# Patient Record
Sex: FEMALE | Race: BLACK OR AFRICAN AMERICAN | NOT HISPANIC OR LATINO | URBAN - METROPOLITAN AREA
[De-identification: names, ages, dates, MRNs, and addresses within clinical notes are randomized per-mention and may not be internally consistent; named-entity substitution may affect disease eponyms.]

---

## 2019-03-21 ENCOUNTER — EMERGENCY (EMERGENCY)
Facility: HOSPITAL | Age: 54
LOS: 0 days | Discharge: HOME | End: 2019-03-22
Attending: EMERGENCY MEDICINE
Payer: COMMERCIAL

## 2019-03-21 VITALS
RESPIRATION RATE: 18 BRPM | HEART RATE: 68 BPM | DIASTOLIC BLOOD PRESSURE: 90 MMHG | SYSTOLIC BLOOD PRESSURE: 166 MMHG | TEMPERATURE: 98 F | OXYGEN SATURATION: 100 % | HEIGHT: 62 IN | WEIGHT: 175.93 LBS

## 2019-03-21 DIAGNOSIS — M54.2 CERVICALGIA: ICD-10-CM
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
## 2019-03-21 DIAGNOSIS — M54.9 DORSALGIA, UNSPECIFIED: ICD-10-CM
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
## 2019-03-21 DIAGNOSIS — I10 ESSENTIAL (PRIMARY) HYPERTENSION: ICD-10-CM

## 2019-03-21 DIAGNOSIS — M79.18 MYALGIA, OTHER SITE: ICD-10-CM

## 2019-03-21 PROCEDURE — 93010 ELECTROCARDIOGRAM REPORT: CPT

## 2019-03-21 PROCEDURE — 99284 EMERGENCY DEPT VISIT MOD MDM: CPT

## 2019-03-21 RX ORDER — CYCLOBENZAPRINE HYDROCHLORIDE 10 MG/1
1 TABLET, FILM COATED ORAL
Qty: 3 | Refills: 0 | OUTPATIENT
Start: 2019-03-21 | End: 2019-03-23

## 2019-03-21 RX ORDER — OXYCODONE AND ACETAMINOPHEN 5; 325 MG/1; MG/1
2 TABLET ORAL ONCE
Qty: 0 | Refills: 0 | Status: DISCONTINUED | OUTPATIENT
Start: 2019-03-21 | End: 2019-03-21

## 2019-03-21 RX ORDER — KETOROLAC TROMETHAMINE 30 MG/ML
60 SYRINGE (ML) INJECTION ONCE
Qty: 0 | Refills: 0 | Status: DISCONTINUED | OUTPATIENT
Start: 2019-03-21 | End: 2019-03-21

## 2019-03-21 RX ADMIN — Medication 60 MILLIGRAM(S): at 23:44

## 2019-03-21 RX ADMIN — OXYCODONE AND ACETAMINOPHEN 2 TABLET(S): 5; 325 TABLET ORAL at 23:45

## 2019-03-21 NOTE — ED PROVIDER NOTE - CLINICAL SUMMARY MEDICAL DECISION MAKING FREE TEXT BOX
patient received percocet and toradol. she is in good condition and wants to go home. d/c with care instructions. f/up with pmd.

## 2019-03-21 NOTE — ED PROVIDER NOTE - MUSCULOSKELETAL, MLM
Spine appears normal, range of motion is not limited, left sided thoracic paraspinal tenderness, ambulating well, neurovascularly intact, rom intact
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
## 2019-03-21 NOTE — ED ADULT NURSE NOTE - NSIMPLEMENTINTERV_GEN_ALL_ED
Implemented All Universal Safety Interventions:  [place] to call system. Call bell, personal items and telephone within reach. Instruct patient to call for assistance. Room bathroom lighting operational. Non-slip footwear when patient is off stretcher. Physically safe environment: no spills, clutter or unnecessary equipment. Stretcher in lowest position, wheels locked, appropriate side rails in place.

## 2019-03-22 DIAGNOSIS — M54.2 CERVICALGIA: ICD-10-CM

## 2019-03-22 DIAGNOSIS — M79.18 MYALGIA, OTHER SITE: ICD-10-CM

## 2019-03-22 DIAGNOSIS — M54.9 DORSALGIA, UNSPECIFIED: ICD-10-CM
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
## 2019-03-22 DIAGNOSIS — I10 ESSENTIAL (PRIMARY) HYPERTENSION: ICD-10-CM
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
## 2020-11-15 ENCOUNTER — EMERGENCY (EMERGENCY)
Facility: HOSPITAL | Age: 55
LOS: 0 days | Discharge: HOME | End: 2020-11-15
Attending: STUDENT IN AN ORGANIZED HEALTH CARE EDUCATION/TRAINING PROGRAM
Payer: COMMERCIAL

## 2020-11-15 VITALS
SYSTOLIC BLOOD PRESSURE: 135 MMHG | OXYGEN SATURATION: 98 % | DIASTOLIC BLOOD PRESSURE: 80 MMHG | RESPIRATION RATE: 19 BRPM | HEART RATE: 76 BPM

## 2020-11-15 VITALS
HEIGHT: 62 IN | TEMPERATURE: 99 F | WEIGHT: 149.91 LBS | OXYGEN SATURATION: 99 % | DIASTOLIC BLOOD PRESSURE: 101 MMHG | HEART RATE: 80 BPM | RESPIRATION RATE: 18 BRPM | SYSTOLIC BLOOD PRESSURE: 157 MMHG

## 2020-11-15 DIAGNOSIS — E03.9 HYPOTHYROIDISM, UNSPECIFIED: ICD-10-CM

## 2020-11-15 DIAGNOSIS — R42 DIZZINESS AND GIDDINESS: ICD-10-CM

## 2020-11-15 DIAGNOSIS — H81.399 OTHER PERIPHERAL VERTIGO, UNSPECIFIED EAR: ICD-10-CM

## 2020-11-15 DIAGNOSIS — I10 ESSENTIAL (PRIMARY) HYPERTENSION: ICD-10-CM

## 2020-11-15 DIAGNOSIS — R07.89 OTHER CHEST PAIN: ICD-10-CM

## 2020-11-15 DIAGNOSIS — K30 FUNCTIONAL DYSPEPSIA: ICD-10-CM

## 2020-11-15 LAB
ALBUMIN SERPL ELPH-MCNC: 3.4 G/DL — SIGNIFICANT CHANGE UP (ref 3.3–5)
ALP SERPL-CCNC: 106 U/L — SIGNIFICANT CHANGE UP (ref 40–120)
ALT FLD-CCNC: 10 U/L — LOW (ref 12–78)
ANION GAP SERPL CALC-SCNC: 7 MMOL/L — SIGNIFICANT CHANGE UP (ref 5–17)
AST SERPL-CCNC: 18 U/L — SIGNIFICANT CHANGE UP (ref 15–37)
BASOPHILS # BLD AUTO: 0.02 K/UL — SIGNIFICANT CHANGE UP (ref 0–0.2)
BASOPHILS NFR BLD AUTO: 0.6 % — SIGNIFICANT CHANGE UP (ref 0–2)
BILIRUB SERPL-MCNC: 0.8 MG/DL — SIGNIFICANT CHANGE UP (ref 0.2–1.2)
BUN SERPL-MCNC: 7 MG/DL — SIGNIFICANT CHANGE UP (ref 7–23)
CALCIUM SERPL-MCNC: 9.5 MG/DL — SIGNIFICANT CHANGE UP (ref 8.5–10.1)
CHLORIDE SERPL-SCNC: 101 MMOL/L — SIGNIFICANT CHANGE UP (ref 96–108)
CO2 SERPL-SCNC: 30 MMOL/L — SIGNIFICANT CHANGE UP (ref 22–31)
CREAT SERPL-MCNC: 0.88 MG/DL — SIGNIFICANT CHANGE UP (ref 0.5–1.3)
EOSINOPHIL # BLD AUTO: 0.02 K/UL — SIGNIFICANT CHANGE UP (ref 0–0.5)
EOSINOPHIL NFR BLD AUTO: 0.6 % — SIGNIFICANT CHANGE UP (ref 0–6)
GLUCOSE SERPL-MCNC: 96 MG/DL — SIGNIFICANT CHANGE UP (ref 70–99)
HCT VFR BLD CALC: 36.1 % — SIGNIFICANT CHANGE UP (ref 34.5–45)
HGB BLD-MCNC: 11.7 G/DL — SIGNIFICANT CHANGE UP (ref 11.5–15.5)
IMM GRANULOCYTES NFR BLD AUTO: 0 % — SIGNIFICANT CHANGE UP (ref 0–1.5)
INR BLD: 1.19 RATIO — HIGH (ref 0.88–1.16)
LYMPHOCYTES # BLD AUTO: 1.39 K/UL — SIGNIFICANT CHANGE UP (ref 1–3.3)
LYMPHOCYTES # BLD AUTO: 41.2 % — SIGNIFICANT CHANGE UP (ref 13–44)
MCHC RBC-ENTMCNC: 26.2 PG — LOW (ref 27–34)
MCHC RBC-ENTMCNC: 32.4 GM/DL — SIGNIFICANT CHANGE UP (ref 32–36)
MCV RBC AUTO: 80.9 FL — SIGNIFICANT CHANGE UP (ref 80–100)
MONOCYTES # BLD AUTO: 0.33 K/UL — SIGNIFICANT CHANGE UP (ref 0–0.9)
MONOCYTES NFR BLD AUTO: 9.8 % — SIGNIFICANT CHANGE UP (ref 2–14)
NEUTROPHILS # BLD AUTO: 1.61 K/UL — LOW (ref 1.8–7.4)
NEUTROPHILS NFR BLD AUTO: 47.8 % — SIGNIFICANT CHANGE UP (ref 43–77)
NRBC # BLD: 0 /100 WBCS — SIGNIFICANT CHANGE UP (ref 0–0)
PLATELET # BLD AUTO: 207 K/UL — SIGNIFICANT CHANGE UP (ref 150–400)
POTASSIUM SERPL-MCNC: 3.2 MMOL/L — LOW (ref 3.5–5.3)
POTASSIUM SERPL-SCNC: 3.2 MMOL/L — LOW (ref 3.5–5.3)
PROT SERPL-MCNC: 7.7 GM/DL — SIGNIFICANT CHANGE UP (ref 6–8.3)
PROTHROM AB SERPL-ACNC: 13.7 SEC — HIGH (ref 10.6–13.6)
RBC # BLD: 4.46 M/UL — SIGNIFICANT CHANGE UP (ref 3.8–5.2)
RBC # FLD: 12.7 % — SIGNIFICANT CHANGE UP (ref 10.3–14.5)
SODIUM SERPL-SCNC: 138 MMOL/L — SIGNIFICANT CHANGE UP (ref 135–145)
TROPONIN I SERPL-MCNC: <.015 NG/ML — SIGNIFICANT CHANGE UP (ref 0.01–0.04)
WBC # BLD: 3.37 K/UL — LOW (ref 3.8–10.5)
WBC # FLD AUTO: 3.37 K/UL — LOW (ref 3.8–10.5)

## 2020-11-15 PROCEDURE — 99285 EMERGENCY DEPT VISIT HI MDM: CPT

## 2020-11-15 PROCEDURE — 93010 ELECTROCARDIOGRAM REPORT: CPT

## 2020-11-15 PROCEDURE — 71045 X-RAY EXAM CHEST 1 VIEW: CPT | Mod: 26

## 2020-11-15 RX ORDER — FAMOTIDINE 10 MG/ML
20 INJECTION INTRAVENOUS ONCE
Refills: 0 | Status: COMPLETED | OUTPATIENT
Start: 2020-11-15 | End: 2020-11-15

## 2020-11-15 RX ORDER — MECLIZINE HCL 12.5 MG
25 TABLET ORAL ONCE
Refills: 0 | Status: COMPLETED | OUTPATIENT
Start: 2020-11-15 | End: 2020-11-15

## 2020-11-15 RX ORDER — KETOROLAC TROMETHAMINE 30 MG/ML
15 SYRINGE (ML) INJECTION ONCE
Refills: 0 | Status: DISCONTINUED | OUTPATIENT
Start: 2020-11-15 | End: 2020-11-15

## 2020-11-15 RX ORDER — SODIUM CHLORIDE 9 MG/ML
1000 INJECTION INTRAMUSCULAR; INTRAVENOUS; SUBCUTANEOUS ONCE
Refills: 0 | Status: COMPLETED | OUTPATIENT
Start: 2020-11-15 | End: 2020-11-15

## 2020-11-15 RX ORDER — MECLIZINE HCL 12.5 MG
1 TABLET ORAL
Qty: 16 | Refills: 0
Start: 2020-11-15 | End: 2020-11-21

## 2020-11-15 RX ORDER — POTASSIUM CHLORIDE 20 MEQ
40 PACKET (EA) ORAL ONCE
Refills: 0 | Status: COMPLETED | OUTPATIENT
Start: 2020-11-15 | End: 2020-11-15

## 2020-11-15 RX ADMIN — Medication 30 MILLILITER(S): at 20:29

## 2020-11-15 RX ADMIN — Medication 25 MILLIGRAM(S): at 20:45

## 2020-11-15 RX ADMIN — SODIUM CHLORIDE 1000 MILLILITER(S): 9 INJECTION INTRAMUSCULAR; INTRAVENOUS; SUBCUTANEOUS at 20:29

## 2020-11-15 RX ADMIN — FAMOTIDINE 20 MILLIGRAM(S): 10 INJECTION INTRAVENOUS at 20:29

## 2020-11-15 RX ADMIN — Medication 40 MILLIEQUIVALENT(S): at 21:59

## 2020-11-15 RX ADMIN — Medication 15 MILLIGRAM(S): at 20:29

## 2020-11-15 NOTE — ED PROVIDER NOTE - NSFOLLOWUPINSTRUCTIONS_ED_ALL_ED_FT
1) Please follow-up with your primary care doctor.  If you cannot follow-up with your doctor(s), please return to the ED for any urgent issues.  2) If you have any worsening of symptoms, including worsening chest pain, difficulty breathing, sensation that you might pass out, or any other concerns please return to the ED immediately.  3) Please continue taking your home medications as directed.  4) You may have been given a copy of your labs and/or imaging.  Please go over these with your primary care doctor.   5) You may take Pepcid and/or Maalox if the gastrointestinal symptoms you had this morning continue. Both medications are available over the counter - please follow the dosing instructions on the packaging.

## 2020-11-15 NOTE — ED ADULT NURSE NOTE - NSIMPLEMENTINTERV_GEN_ALL_ED
Implemented All Universal Safety Interventions:  [place] to call system. Call bell, personal items and telephone within reach. Instruct patient to call for assistance. Room bathroom lighting operational. Non-slip footwear when patient is off stretcher. Physically safe environment: no spills, clutter or unnecessary equipment. Stretcher in lowest position, wheels locked, appropriate side rails in place.

## 2020-11-15 NOTE — ED ADULT NURSE NOTE - TEMPLATE LIST FOR HEAD TO TOE ASSESSMENT
Mrs. Yeh is a 73 year old female who was last seen in GI clinic on 4/29/2020 for dysphagia and epigastric abdominal pain.    At time of last clinic visit we switched to dexilant as nexium, protonix, and prilosec were not working. She reports dexilant was to expensive.   We subsequently prescribed carafate which she stopped taking as it made her lightheaded and dizzy. She reports after stopping carafate the lightheadness and dizziness stopped.   At last clinic visit we ordered an esophageal manometry which she reports not yet having completed.   Today she reports continuing to have dysphagia. She reports dysphagia with carrots, chicken fingers, spaghetti, and hamburger.   She is currently on nexium 40mg daily.   She reports her last colonoscopy was in 2017 with Dr. Alejo.   Prior history is summarized below: -She reports having dysphagia symptoms since approximately 2006. She estimates she has had up to 10 dilations in the past. She reports her last dilation was around Fall 2017.  -Mrs. Yeh also reports having epigastric abdominal pain. She reports has been present since she was approximately 25 years old. She reported taking nexium 40mg daily since approximately 1/2019.  -On 11/8/2019 she had an EGD performed. The EGD showed a medium hiatal hernia and minimal gastritis. During the EGD she was dilated to 20 mm. Pathology of the mid esophagus and distal esophagus was suggestive for reflux associated changes and was without eosinophilic esophagitis findings. Gastric biopsies were negative for H. pylori. -On 12/20/2019 she had a barium swallow which showed a moderate hiatal hernia with gastroesophageal reflux. To and fro intraesophageal reflux was also noted.  -CT A/P on 1/27/2020 showed small hiatal hernia. Abdominal Pain, N/V/D

## 2020-11-15 NOTE — ED ADULT TRIAGE NOTE - CHIEF COMPLAINT QUOTE
Pt c/o intermittent chest tightness, hypertension, nausea, and ringing in b/l ear x yesterday. h/o hypertension, vertigo, hypothyroid.

## 2020-11-15 NOTE — ED PROVIDER NOTE - OBJECTIVE STATEMENT
55F hx hypothyroid, htn, peripheral vertigo, not currently on any medications, p/f light-headedness, sensation of 'fullness' in her head (that pt describes as just getting off of a flight), and in the last ~2-3 hours, chest tightness after eating some broccoli and rice. Pt says sensation is 'like food is not digesting'. Denies syncope, fever, cough, abdominal pain, n/v.    Pt notes that her BPs have been higher than usual lately and as such she's meaning to see her PCP soon to get back on a BP med. No longer menstruating.

## 2020-11-15 NOTE — ED PROVIDER NOTE - NSTIMEPROVIDERCAREINITIATE_GEN_ER
Refill request for pantoprazole  Last ordered 12/2018  Last OV 4/2019  rx pended for MD approval  
15-Nov-2020 20:05

## 2020-11-15 NOTE — ED PROVIDER NOTE - CLINICAL SUMMARY MEDICAL DECISION MAKING FREE TEXT BOX
55F p/f chest tightness and light-headedness. Likely idiopathic/metabolic/GI in nature however warrants ACS r/o given age, hx of htn. No hypoxia, tachycardia, sob, or pleuritic cp to suggest PE w/u needed. Pt reports no ataxia, headache, vision changes, or paresthesias to warrant CVA w/u.

## 2020-11-15 NOTE — ED PROVIDER NOTE - PATIENT PORTAL LINK FT
You can access the FollowMyHealth Patient Portal offered by [facility] by registering at the following website: [email or web address]. By joining [facility]’s FollowMyHealth portal, you will also be able to view your health information using other applications (apps) compatible with our system.

## 2020-11-16 PROBLEM — Z78.9 OTHER SPECIFIED HEALTH STATUS: Chronic | Status: ACTIVE | Noted: 2019-03-21

## 2021-04-24 ENCOUNTER — EMERGENCY (EMERGENCY)
Facility: HOSPITAL | Age: 56
LOS: 0 days | Discharge: HOME | End: 2021-04-25
Attending: EMERGENCY MEDICINE
Payer: COMMERCIAL

## 2021-04-24 VITALS
WEIGHT: 130.95 LBS | TEMPERATURE: 98 F | SYSTOLIC BLOOD PRESSURE: 173 MMHG | HEIGHT: 62 IN | OXYGEN SATURATION: 100 % | HEART RATE: 74 BPM | DIASTOLIC BLOOD PRESSURE: 105 MMHG | RESPIRATION RATE: 18 BRPM

## 2021-04-24 DIAGNOSIS — R20.0 ANESTHESIA OF SKIN: ICD-10-CM

## 2021-04-24 DIAGNOSIS — I10 ESSENTIAL (PRIMARY) HYPERTENSION: ICD-10-CM

## 2021-04-24 LAB
ALBUMIN SERPL ELPH-MCNC: 3.9 G/DL — SIGNIFICANT CHANGE UP (ref 3.3–5)
ALP SERPL-CCNC: 123 U/L — HIGH (ref 40–120)
ALT FLD-CCNC: 16 U/L — SIGNIFICANT CHANGE UP (ref 12–78)
ANION GAP SERPL CALC-SCNC: 7 MMOL/L — SIGNIFICANT CHANGE UP (ref 5–17)
APTT BLD: 39.4 SEC — HIGH (ref 27.5–35.5)
AST SERPL-CCNC: 23 U/L — SIGNIFICANT CHANGE UP (ref 15–37)
BASOPHILS # BLD AUTO: 0.02 K/UL — SIGNIFICANT CHANGE UP (ref 0–0.2)
BASOPHILS NFR BLD AUTO: 0.5 % — SIGNIFICANT CHANGE UP (ref 0–2)
BILIRUB SERPL-MCNC: 0.7 MG/DL — SIGNIFICANT CHANGE UP (ref 0.2–1.2)
BUN SERPL-MCNC: 6 MG/DL — LOW (ref 7–23)
CALCIUM SERPL-MCNC: 9.4 MG/DL — SIGNIFICANT CHANGE UP (ref 8.5–10.1)
CHLORIDE SERPL-SCNC: 102 MMOL/L — SIGNIFICANT CHANGE UP (ref 96–108)
CO2 SERPL-SCNC: 29 MMOL/L — SIGNIFICANT CHANGE UP (ref 22–31)
CREAT SERPL-MCNC: 0.82 MG/DL — SIGNIFICANT CHANGE UP (ref 0.5–1.3)
EOSINOPHIL # BLD AUTO: 0.04 K/UL — SIGNIFICANT CHANGE UP (ref 0–0.5)
EOSINOPHIL NFR BLD AUTO: 1 % — SIGNIFICANT CHANGE UP (ref 0–6)
GLUCOSE BLDC GLUCOMTR-MCNC: 91 MG/DL — SIGNIFICANT CHANGE UP (ref 70–99)
GLUCOSE SERPL-MCNC: 97 MG/DL — SIGNIFICANT CHANGE UP (ref 70–99)
HCT VFR BLD CALC: 39.6 % — SIGNIFICANT CHANGE UP (ref 34.5–45)
HGB BLD-MCNC: 12.5 G/DL — SIGNIFICANT CHANGE UP (ref 11.5–15.5)
IMM GRANULOCYTES NFR BLD AUTO: 0.3 % — SIGNIFICANT CHANGE UP (ref 0–1.5)
INR BLD: 1.05 RATIO — SIGNIFICANT CHANGE UP (ref 0.88–1.16)
LYMPHOCYTES # BLD AUTO: 2.5 K/UL — SIGNIFICANT CHANGE UP (ref 1–3.3)
LYMPHOCYTES # BLD AUTO: 63.8 % — HIGH (ref 13–44)
MAGNESIUM SERPL-MCNC: 2.2 MG/DL — SIGNIFICANT CHANGE UP (ref 1.6–2.6)
MCHC RBC-ENTMCNC: 26.4 PG — LOW (ref 27–34)
MCHC RBC-ENTMCNC: 31.6 GM/DL — LOW (ref 32–36)
MCV RBC AUTO: 83.5 FL — SIGNIFICANT CHANGE UP (ref 80–100)
MONOCYTES # BLD AUTO: 0.32 K/UL — SIGNIFICANT CHANGE UP (ref 0–0.9)
MONOCYTES NFR BLD AUTO: 8.2 % — SIGNIFICANT CHANGE UP (ref 2–14)
NEUTROPHILS # BLD AUTO: 1.03 K/UL — LOW (ref 1.8–7.4)
NEUTROPHILS NFR BLD AUTO: 26.2 % — LOW (ref 43–77)
NRBC # BLD: 0 /100 WBCS — SIGNIFICANT CHANGE UP (ref 0–0)
PLATELET # BLD AUTO: 237 K/UL — SIGNIFICANT CHANGE UP (ref 150–400)
POTASSIUM SERPL-MCNC: 3.8 MMOL/L — SIGNIFICANT CHANGE UP (ref 3.5–5.3)
POTASSIUM SERPL-SCNC: 3.8 MMOL/L — SIGNIFICANT CHANGE UP (ref 3.5–5.3)
PROT SERPL-MCNC: 8.7 GM/DL — HIGH (ref 6–8.3)
PROTHROM AB SERPL-ACNC: 12.2 SEC — SIGNIFICANT CHANGE UP (ref 10.6–13.6)
RBC # BLD: 4.74 M/UL — SIGNIFICANT CHANGE UP (ref 3.8–5.2)
RBC # FLD: 13.1 % — SIGNIFICANT CHANGE UP (ref 10.3–14.5)
SODIUM SERPL-SCNC: 138 MMOL/L — SIGNIFICANT CHANGE UP (ref 135–145)
TROPONIN I SERPL-MCNC: <.015 NG/ML — SIGNIFICANT CHANGE UP (ref 0.01–0.04)
WBC # BLD: 3.92 K/UL — SIGNIFICANT CHANGE UP (ref 3.8–10.5)
WBC # FLD AUTO: 3.92 K/UL — SIGNIFICANT CHANGE UP (ref 3.8–10.5)

## 2021-04-24 PROCEDURE — 99285 EMERGENCY DEPT VISIT HI MDM: CPT

## 2021-04-24 PROCEDURE — 71045 X-RAY EXAM CHEST 1 VIEW: CPT | Mod: 26

## 2021-04-24 PROCEDURE — 70450 CT HEAD/BRAIN W/O DYE: CPT | Mod: 26,MA

## 2021-04-24 NOTE — ED PROVIDER NOTE - OBJECTIVE STATEMENT
57 y/o F with no pertinent PMHx presents to the ED c/o numbness and tingling that started yesterday, as well as fluctuating high blood pressure. Pt endorses numbness on left side her body, intermittent slight headaches but denies weakness, fever/chills, SOB, abdominal pain or N/V/D. Pt is currently not on any medications for her blood pressure, states she has been trying to figure out the right medication for her. Patient denies EtOH/tobacco/illicit substance use.

## 2021-04-24 NOTE — ED ADULT TRIAGE NOTE - CHIEF COMPLAINT QUOTE
c/o intermittent numbness sensation to l face arm and leg since yesterday states tingling sensation with fluctuating blood pressure previously treated for htn but hasn't been on bp meds x 5 years denies dizziness, slight headache no facial asymmetry noted speech clear no focal neuro deficits or weakness noted at present

## 2021-04-24 NOTE — ED PROVIDER NOTE - CARE PROVIDER_API CALL
Bj Martin)  Neurology  [address], [address]  [place] [address]  Phone: [phone]  Fax: [phone]  Follow Up Time: Urgent

## 2021-04-24 NOTE — ED PROVIDER NOTE - CLINICAL SUMMARY MEDICAL DECISION MAKING FREE TEXT BOX
Likely hypertensive urgency, reinitiate HCTZ, plan for labs and head CT. Likely hypertensive urgency, reinitiate HCTZ, plan for labs and head CT. NIH 0. abcd2 score is amenable for outpatient follow up. ct wnl. labs reassuring. ok for dc home

## 2021-04-24 NOTE — ED PROVIDER NOTE - NSFOLLOWUPINSTRUCTIONS_ED_ALL_ED_FT
Start taking the new blood pressure medication as prescribed.    Call the neurologist for an immediate follow up appointment.

## 2021-04-24 NOTE — ED PROVIDER NOTE - PATIENT PORTAL LINK FT
You can access the FollowMyHealth Patient Portal offered by [facility] by registering at the following website: [email or web address]. By joining [facility]’s FollowMyHealth portal, you will also be able to view your health information using other applications (apps) compatible with our system.

## 2021-04-24 NOTE — ED ADULT NURSE NOTE - OBJECTIVE STATEMENT
pt received to bed 12 c/o headache and high blood pressure x2 days. pt states her BP was 170s/90s PTA. c/o left sided numbness and tingling over all left side of body. witnessed pt ambulate with steady gait with standby assistance. pt is refusing covid swab at this time

## 2021-04-25 VITALS
TEMPERATURE: 98 F | OXYGEN SATURATION: 100 % | DIASTOLIC BLOOD PRESSURE: 85 MMHG | RESPIRATION RATE: 18 BRPM | HEART RATE: 63 BPM | SYSTOLIC BLOOD PRESSURE: 162 MMHG

## 2021-04-25 RX ORDER — HYDROCHLOROTHIAZIDE 25 MG
25 TABLET ORAL ONCE
Refills: 0 | Status: COMPLETED | OUTPATIENT
Start: 2021-04-25 | End: 2021-04-25

## 2021-04-25 RX ADMIN — Medication 25 MILLIGRAM(S): at 00:37

## 2021-07-13 NOTE — ED ADULT NURSE NOTE - NS ED NURSE RECORD ANOTHER VITAL SIGN
Left Foot Bandage Change  Wash your hands before and after the dressing change. You may wear gloves if you choose. Gloves are available over the counter at [facility], [facility], [facility], etc.   Use scissors at home that you can designate solely for wound care and cleanse well with each use, (rubbing alcohol or alcohol pads work well for this).    Wash hands.  Remove old dressing.  Wash hands.  1.) Cleanse wound with spray wound cleanser and wipe gently with gauze to dry. If there is some zinc cream remaining around the wound that you can't get off that's ok, just apply more over this, scrubbing may be harmful to new skin forming  2.) Protect surrounding skin with layer of zinc cream (critic aid paste provided)   3.) Apply Silver foam (Mepilex Ag,) sticky side down to wound, secure with Medipore tape  Change dressing daily    Follow up 2 weeks    Continue work on blood glucose control.    Look at [employer] for examples of padding you can use to protect the balls of your feet (for long term prevention).    Signs and symptoms of infection:  Bright green drainage  Foul odor  Increasing pain in area  New redness or swelling at the site of the wound or streaks up from wound  New warmth to touch around wound accompanied by redness and swelling  Fever    Need to be seen as soon as possible for infection concerns.    Please call with any questions or concerns.  Osiris Sarah RN, CWOCN   [phone]         Yes

## 2024-08-13 NOTE — ED ADULT NURSE NOTE - NO SIGNIFICANT PAST SURGICAL HISTORY
Diagnosed on 7/2023 by monitor.    Echocardiogram 7/26/2023 EF 65%, IVS 0.9 cm, LVIW 1.1 cm, LA vol 29.2 ml/m2  Coronary status: 7/26/2026 NST: no ischemia, EF 72%  CHADS-VASC score  2 (age gender)  Anticoagulation therapy: Eliquis 5 mg BID started 7/26/2023, on hold 7/30/2023 due to falls/presyncope  Antiarrhythmic medication: none  Procedure: none    Occasionally she feels palpitation (pounding)!  The rate was controlled by monitor with low burden.  She is in sinus rhythm today.  Tolerating Eliquis.  Repeat two weeks monitor on 09/26/2023 showed no atrial fibrillation.  However it shows very short episodes of atrial tachycardia lasting for few seconds.  In June she had rectal bleeding with low H/H. The Eliquis was held. Now back on 2.5 mg twice a day. She is having small amount of bleeding. Going for hemorrhoid surgery.    1. Discussed with patient the findings and options of management.  2. Continue Metoprolol.  3. Ok to hold Eliquis and proceed with surgery.  4. Not candidate for LAAO due to low CHADS-VASC score (it need to be at 3 or above)  5. Follow up in 3 months.  6. Encouraged to have a second opinion with Dr. Alonos.      
Having hemorrhoid bleeding.  She has multiple falls.    Creatinine 6/24/2024 - 0.38  LFT 6/24/2024 - normal.  H/H 7/3/2024 - 10.4/33.6    1. Continue with 2.5 mg twice a day.  2. Ok to hold it for surgery.  
Referred by Dr. Causey   Three months ago she had syncope in [place]. Then was admitted to hospital with near syncope and found to have orthostatic hypotension in the hospital after taking Metoprolol.  She feels weak with no further episodes of syncope or near syncope.  Started on Midodrine 7/27/2023 and increased up to 7.5 mg three times a day on 08/15/2023.  She is feeling much better without any further episodes of syncope.  She still have some minimal lightheadedness and palpitations once in a while.  However she is having more side effects from the midodrine “itchy scalp and goosebumps\".    No further syncope since Florinef was started. She still has dizziness with orthostatic hypotension today. She is feeling ok and not taking her Midodrine frequently due to itching.    1. Discussed with patient and her son the findings and options of management.  2. Increase hydration and salt intake.  3. Increase exercise.  4. Increase Florinef to 0.2 mg po daily.  5. May change Midodrine to PRN.  6. Will consider Northera if symptoms persist.  
Stable at 499 ms.  Continue monitoring.  Continue Metoprolol. Change it to Toprol XL 25 mg daily.  Avoid any medication can increase QT interval.    
<<----- Click to add NO significant Past Surgical History

## 2025-04-24 NOTE — ED ADULT NURSE NOTE - MODE OF DISCHARGE
Abdomen , soft, nontender, nondistended , no guarding or rigidity , no masses palpable , normal bowel sounds , Liver and Spleen , no hepatomegaly present , no hepatosplenomegaly , liver nontender , spleen not palpable
Ambulatory